# Patient Record
Sex: FEMALE | Race: WHITE | URBAN - METROPOLITAN AREA
[De-identification: names, ages, dates, MRNs, and addresses within clinical notes are randomized per-mention and may not be internally consistent; named-entity substitution may affect disease eponyms.]

---

## 2017-09-22 ENCOUNTER — VIRTUAL VISIT (OUTPATIENT)
Dept: FAMILY MEDICINE | Facility: OTHER | Age: 32
End: 2017-09-22

## 2017-09-22 NOTE — PROGRESS NOTES
"Date:   Clinician: Andie Johnston  Clinician NPI: [id number]  Patient: magdiel poole  Patient : 1985  Patient Address: [address] [place] [address], [place] [address]  Patient Phone: [phone]  Visit Protocol: URI  Patient Summary:  magdiel is a 32 year old ( : 1985 ) female who initiated a Visit for cold, sinus infection, or influenza. When asked the question \"Please sign me up to receive news, health information and promotions from [employer].\", magdeil responded \"No\".    magdiel states her symptoms started gradually 2-3 weeks ago.   Her symptoms consist of nasal congestion, malaise, facial pain or pressure, a headache, and tooth pain.   Symptom Details     Nasal secretions: The color of her mucus is green and clear.    Facial pain or pressure: The facial pain or pressure feels worse when bending over or leaning forward.     Headache: She states the headache is moderate.     Tooth pain: The tooth pain is not caused by a cavity, recent dental work, or other mouth problems.      magdiel denies having wheezing, myalgias, sore throat, rhinitis, dyspnea, ear pain, cough, chills, and fever. She also denies taking antibiotic medication for the symptoms, having recent facial or sinus surgery in the past 60 days, and double sickening.    Weight: 138 lbs   magdiel does not smoke or use smokeless tobacco.   She denies pregnancy and denies breastfeeding. She has menstruated in the past month.   MEDICATIONS:  Birth control pill   Patient free text response: Allegra and flonase  , ALLERGIES:  NKDA   Clinician Response:  Dear magdiel,  Based on the information you have provided, you likely have  acute bacterial sinusitis, otherwise known as a sinus infection.  I am prescribing amoxicillin-Pot Clavulanate [Augmentin] 875-125mg. Take one tablet by mouth two times a day for 10 days. There are no refills with this prescription.   Sinus pressure occurs when the tissues lining your sinuses become swollen and inflamed. Afrin " nasal spray decreases the swelling to provide the quickest and most effective relief from sinus pressure.  Use oxymetazoline (Afrin or store brand) nasal spray. Spray once in each nostril twice per day for a maximum of 3 days. Using this medication more frequently or longer than recommended may cause nasal congestion to reoccur or worsen. This is an over-the-counter medication you can find at most pharmacies.   Unless your are allergic to the over-the-counter medication(s) below, I recommend using:   A decongestant such as pseudoephedrine (Sudafed or store brand) to help your symptoms. This is an over-the-counter medication that does not require a prescription.   Acetaminophen (Tylenol), which helps to reduce your discomfort and fever. Take 1-2 pills every 4-6 hours. This is an over-the-counter medication that does not require a prescription.   Ibuprofen. Take 1-3 tablets (200-600mg) every 8 hours to help with the discomfort. Make sure to take the ibuprofen with food. Do not exceed 2400mg in 24 hours. This is an over-the-counter medication that does not require a prescription.   Some people develop allergies to antibiotics. If you have significant swelling or difficulty breathing, stop the medication immediately and call 911 or go to an emergency room. If you notice a new rash, be seen at a clinic or urgent care.  Some women may also develop a yeast infection as a side effect of taking antibiotics. If you notice symptoms of a yeast infection, please use [facility] to get treatment.  If you become pregnant during this course of treatment, stop taking the medication and contact your primary care provider.  You will feel better faster if you take care of yourself by getting more rest and drinking plenty of liquids, especially water.  Remember to wash your hands often and stay home while you are sick to decrease the chance you will spread your infection to others.   Diagnosis: Acute bacterial sinusitis  Diagnosis ICD:  J01.90  Additional Clinician Notes: Continue Allegra and Flonase daily. It is safe to take Flonase and Afrin at the same time, just don't use the Afrin for more than 3 days.  Prescription: amoxicillin-Pot Clavulanate (Augmentin) 875-125 mg oral tablet 20 tablets, 10 days supply. Take one tablet by mouth two times a day for 10 days. Refills: 0, Refill as needed: no, Allow substitutions: yes  [place]arm[place]: [facility] [place] [place] [phone] - [id number] [place] [address]

## 2018-09-02 ENCOUNTER — HEALTH MAINTENANCE LETTER (OUTPATIENT)
Age: 33
End: 2018-09-02

## 2019-10-16 ENCOUNTER — ALLIED HEALTH/NURSE VISIT (OUTPATIENT)
Dept: NURSING | Facility: CLINIC | Age: 34
End: 2019-10-16
Payer: COMMERCIAL

## 2019-10-16 DIAGNOSIS — Z23 NEED FOR PROPHYLACTIC VACCINATION AND INOCULATION AGAINST INFLUENZA: Primary | ICD-10-CM

## 2019-10-16 PROCEDURE — 90686 IIV4 VACC NO PRSV 0.5 ML IM: CPT

## 2019-10-16 PROCEDURE — 90471 IMMUNIZATION ADMIN: CPT

## 2020-11-22 ENCOUNTER — HEALTH MAINTENANCE LETTER (OUTPATIENT)
Age: 35
End: 2020-11-22

## 2021-09-18 ENCOUNTER — HEALTH MAINTENANCE LETTER (OUTPATIENT)
Age: 36
End: 2021-09-18

## 2022-01-08 ENCOUNTER — HEALTH MAINTENANCE LETTER (OUTPATIENT)
Age: 37
End: 2022-01-08

## 2022-11-20 ENCOUNTER — HEALTH MAINTENANCE LETTER (OUTPATIENT)
Age: 37
End: 2022-11-20

## 2023-04-15 ENCOUNTER — HEALTH MAINTENANCE LETTER (OUTPATIENT)
Age: 38
End: 2023-04-15